# Patient Record
Sex: FEMALE | Race: WHITE | Employment: OTHER | URBAN - METROPOLITAN AREA
[De-identification: names, ages, dates, MRNs, and addresses within clinical notes are randomized per-mention and may not be internally consistent; named-entity substitution may affect disease eponyms.]

---

## 2018-10-15 RX ORDER — DULOXETIN HYDROCHLORIDE 60 MG/1
60 CAPSULE, DELAYED RELEASE ORAL 2 TIMES DAILY
COMMUNITY

## 2018-10-15 RX ORDER — LIOTHYRONINE SODIUM 5 UG/1
10 TABLET ORAL DAILY
COMMUNITY

## 2018-10-17 ENCOUNTER — HOSPITAL ENCOUNTER (OUTPATIENT)
Age: 68
Setting detail: OUTPATIENT SURGERY
Discharge: HOME | End: 2018-10-17
Attending: INTERNAL MEDICINE | Admitting: INTERNAL MEDICINE
Payer: MEDICARE

## 2018-10-17 ENCOUNTER — ANESTHESIA (OUTPATIENT)
Dept: ENDOSCOPY | Age: 68
End: 2018-10-17
Payer: MEDICARE

## 2018-10-17 ENCOUNTER — ANESTHESIA EVENT (OUTPATIENT)
Dept: ENDOSCOPY | Age: 68
End: 2018-10-17
Payer: MEDICARE

## 2018-10-17 VITALS
TEMPERATURE: 96 F | HEART RATE: 75 BPM | OXYGEN SATURATION: 95 % | WEIGHT: 195 LBS | SYSTOLIC BLOOD PRESSURE: 140 MMHG | RESPIRATION RATE: 16 BRPM | DIASTOLIC BLOOD PRESSURE: 76 MMHG | BODY MASS INDEX: 28.88 KG/M2 | HEIGHT: 69 IN

## 2018-10-17 VITALS
OXYGEN SATURATION: 97 % | RESPIRATION RATE: 16 BRPM | DIASTOLIC BLOOD PRESSURE: 76 MMHG | SYSTOLIC BLOOD PRESSURE: 120 MMHG

## 2018-10-17 PROCEDURE — 2580000003 HC RX 258: Performed by: ANESTHESIOLOGY

## 2018-10-17 PROCEDURE — 6360000002 HC RX W HCPCS: Performed by: NURSE ANESTHETIST, CERTIFIED REGISTERED

## 2018-10-17 PROCEDURE — 3700000000 HC ANESTHESIA ATTENDED CARE: Performed by: INTERNAL MEDICINE

## 2018-10-17 PROCEDURE — 3700000001 HC ADD 15 MINUTES (ANESTHESIA): Performed by: INTERNAL MEDICINE

## 2018-10-17 PROCEDURE — 3609010200 HC COLONOSCOPY ABLATION TUMOR POLYP/OTHER LES: Performed by: INTERNAL MEDICINE

## 2018-10-17 PROCEDURE — 3609010300 HC COLONOSCOPY W/BIOPSY SINGLE/MULTIPLE: Performed by: INTERNAL MEDICINE

## 2018-10-17 PROCEDURE — 3609010600 HC COLONOSCOPY POLYPECTOMY SNARE/COLD BIOPSY: Performed by: INTERNAL MEDICINE

## 2018-10-17 PROCEDURE — 2500000003 HC RX 250 WO HCPCS: Performed by: NURSE ANESTHETIST, CERTIFIED REGISTERED

## 2018-10-17 PROCEDURE — 88305 TISSUE EXAM BY PATHOLOGIST: CPT

## 2018-10-17 PROCEDURE — 7100000011 HC PHASE II RECOVERY - ADDTL 15 MIN: Performed by: INTERNAL MEDICINE

## 2018-10-17 PROCEDURE — 7100000010 HC PHASE II RECOVERY - FIRST 15 MIN: Performed by: INTERNAL MEDICINE

## 2018-10-17 PROCEDURE — 2580000003 HC RX 258: Performed by: INTERNAL MEDICINE

## 2018-10-17 PROCEDURE — 2709999900 HC NON-CHARGEABLE SUPPLY: Performed by: INTERNAL MEDICINE

## 2018-10-17 RX ORDER — SODIUM CHLORIDE 0.9 % (FLUSH) 0.9 %
10 SYRINGE (ML) INJECTION PRN
Status: DISCONTINUED | OUTPATIENT
Start: 2018-10-17 | End: 2018-10-17 | Stop reason: HOSPADM

## 2018-10-17 RX ORDER — PROPOFOL 10 MG/ML
INJECTION, EMULSION INTRAVENOUS CONTINUOUS PRN
Status: DISCONTINUED | OUTPATIENT
Start: 2018-10-17 | End: 2018-10-17 | Stop reason: SDUPTHER

## 2018-10-17 RX ORDER — LIDOCAINE HYDROCHLORIDE 20 MG/ML
INJECTION, SOLUTION EPIDURAL; INFILTRATION; INTRACAUDAL; PERINEURAL PRN
Status: DISCONTINUED | OUTPATIENT
Start: 2018-10-17 | End: 2018-10-17 | Stop reason: SDUPTHER

## 2018-10-17 RX ORDER — SODIUM CHLORIDE 0.9 % (FLUSH) 0.9 %
10 SYRINGE (ML) INJECTION EVERY 12 HOURS SCHEDULED
Status: DISCONTINUED | OUTPATIENT
Start: 2018-10-17 | End: 2018-10-17 | Stop reason: HOSPADM

## 2018-10-17 RX ORDER — PROPOFOL 10 MG/ML
INJECTION, EMULSION INTRAVENOUS PRN
Status: DISCONTINUED | OUTPATIENT
Start: 2018-10-17 | End: 2018-10-17 | Stop reason: SDUPTHER

## 2018-10-17 RX ORDER — SODIUM CHLORIDE, SODIUM LACTATE, POTASSIUM CHLORIDE, CALCIUM CHLORIDE 600; 310; 30; 20 MG/100ML; MG/100ML; MG/100ML; MG/100ML
INJECTION, SOLUTION INTRAVENOUS CONTINUOUS
Status: DISCONTINUED | OUTPATIENT
Start: 2018-10-17 | End: 2018-10-17 | Stop reason: HOSPADM

## 2018-10-17 RX ORDER — 0.9 % SODIUM CHLORIDE 0.9 %
INTRAVENOUS SOLUTION INTRAVENOUS CONTINUOUS PRN
Status: DISCONTINUED | OUTPATIENT
Start: 2018-10-17 | End: 2018-10-17 | Stop reason: HOSPADM

## 2018-10-17 RX ADMIN — SODIUM CHLORIDE, POTASSIUM CHLORIDE, SODIUM LACTATE AND CALCIUM CHLORIDE: 600; 310; 30; 20 INJECTION, SOLUTION INTRAVENOUS at 06:48

## 2018-10-17 RX ADMIN — LIDOCAINE HYDROCHLORIDE 30 MG: 20 INJECTION, SOLUTION EPIDURAL; INFILTRATION; INTRACAUDAL; PERINEURAL at 07:12

## 2018-10-17 RX ADMIN — PROPOFOL 40 MG: 10 INJECTION, EMULSION INTRAVENOUS at 06:55

## 2018-10-17 RX ADMIN — PROPOFOL 120 MCG/KG/MIN: 10 INJECTION, EMULSION INTRAVENOUS at 06:53

## 2018-10-17 RX ADMIN — PROPOFOL 30 MG: 10 INJECTION, EMULSION INTRAVENOUS at 06:57

## 2018-10-17 RX ADMIN — SODIUM CHLORIDE, POTASSIUM CHLORIDE, SODIUM LACTATE AND CALCIUM CHLORIDE: 600; 310; 30; 20 INJECTION, SOLUTION INTRAVENOUS at 06:34

## 2018-10-17 RX ADMIN — LIDOCAINE HYDROCHLORIDE 20 MG: 20 INJECTION, SOLUTION EPIDURAL; INFILTRATION; INTRACAUDAL; PERINEURAL at 07:09

## 2018-10-17 RX ADMIN — LIDOCAINE HYDROCHLORIDE 100 MG: 20 INJECTION, SOLUTION EPIDURAL; INFILTRATION; INTRACAUDAL; PERINEURAL at 06:53

## 2018-10-17 RX ADMIN — PROPOFOL 50 MG: 10 INJECTION, EMULSION INTRAVENOUS at 06:53

## 2018-10-17 ASSESSMENT — PAIN - FUNCTIONAL ASSESSMENT: PAIN_FUNCTIONAL_ASSESSMENT: 0-10

## 2018-10-17 ASSESSMENT — PAIN SCALES - GENERAL
PAINLEVEL_OUTOF10: 0
PAINLEVEL_OUTOF10: 0

## 2018-10-17 ASSESSMENT — ENCOUNTER SYMPTOMS: SHORTNESS OF BREATH: 1

## 2018-10-17 ASSESSMENT — LIFESTYLE VARIABLES: SMOKING_STATUS: 1

## 2018-10-17 NOTE — ANESTHESIA PRE PROCEDURE
List:  There is no problem list on file for this patient. Past Medical History:        Diagnosis Date    Depression     Fibromyalgia     Shortness of breath on exertion        Past Surgical History:        Procedure Laterality Date    APPENDECTOMY      CHOLECYSTECTOMY      FOOT SURGERY      x2    INTESTINAL BYPASS      RECTAL STRICTURE DILATATION      SHOULDER SURGERY         Social History:    Social History   Substance Use Topics    Smoking status: Current Every Day Smoker     Packs/day: 1.00     Years: 40.00     Types: Cigarettes    Smokeless tobacco: Never Used    Alcohol use No                                Ready to quit: Not Answered  Counseling given: Not Answered      Vital Signs (Current):   Vitals:    10/15/18 1549 10/17/18 0605   BP:  (!) 152/75   Pulse:  88   Resp:  20   Temp:  96.9 °F (36.1 °C)   TempSrc:  Temporal   SpO2:  93%   Weight: 197 lb (89.4 kg) 195 lb (88.5 kg)   Height: 5' 9\" (1.753 m) 5' 9\" (1.753 m)                                              BP Readings from Last 3 Encounters:   10/17/18 (!) 152/75   06/09/14 139/72   05/31/14 142/72       NPO Status: Time of last liquid consumption: 2300                        Time of last solid consumption: 1900                        Date of last liquid consumption: 10/16/18                        Date of last solid food consumption: 10/15/18    BMI:   Wt Readings from Last 3 Encounters:   10/17/18 195 lb (88.5 kg)   06/09/14 197 lb (89.4 kg)   05/31/14 200 lb (90.7 kg)     Body mass index is 28.8 kg/m². CBC: No results found for: WBC, RBC, HGB, HCT, MCV, RDW, PLT    CMP: No results found for: NA, K, CL, CO2, BUN, CREATININE, GFRAA, AGRATIO, LABGLOM, GLUCOSE, PROT, CALCIUM, BILITOT, ALKPHOS, AST, ALT    POC Tests: No results for input(s): POCGLU, POCNA, POCK, POCCL, POCBUN, POCHEMO, POCHCT in the last 72 hours.     Coags: No results found for: PROTIME, INR, APTT    HCG (If Applicable): No results found for: PREGTESTUR, PREGSERUM,

## 2019-03-27 ENCOUNTER — HOSPITAL ENCOUNTER (OUTPATIENT)
Dept: NON INVASIVE DIAGNOSTICS | Age: 69
Discharge: HOME | End: 2019-03-27
Payer: MEDICARE

## 2019-03-27 LAB
LV EF: 71 %
LVEF MODALITY: NORMAL

## 2019-03-27 PROCEDURE — 93306 TTE W/DOPPLER COMPLETE: CPT

## 2020-09-29 ENCOUNTER — OFFICE VISIT (OUTPATIENT)
Dept: ONCOLOGY | Age: 70
End: 2020-09-29
Payer: MEDICARE

## 2020-09-29 ENCOUNTER — HOSPITAL ENCOUNTER (OUTPATIENT)
Dept: INFUSION THERAPY | Age: 70
Discharge: HOME | End: 2020-09-29
Payer: MEDICARE

## 2020-09-29 VITALS
TEMPERATURE: 96.4 F | BODY MASS INDEX: 27.7 KG/M2 | DIASTOLIC BLOOD PRESSURE: 57 MMHG | OXYGEN SATURATION: 93 % | WEIGHT: 187 LBS | SYSTOLIC BLOOD PRESSURE: 119 MMHG | HEART RATE: 77 BPM | HEIGHT: 69 IN

## 2020-09-29 DIAGNOSIS — D72.829 LEUKOCYTOSIS, UNSPECIFIED TYPE: ICD-10-CM

## 2020-09-29 LAB
ALBUMIN SERPL-MCNC: 3.9 G/DL (ref 3.5–5.2)
ALP BLD-CCNC: 86 U/L (ref 35–104)
ALT SERPL-CCNC: 13 U/L (ref 0–32)
ANION GAP SERPL CALCULATED.3IONS-SCNC: 11 MMOL/L (ref 7–16)
AST SERPL-CCNC: 13 U/L (ref 0–31)
BASOPHILS ABSOLUTE: 0.05 E9/L (ref 0–0.2)
BASOPHILS RELATIVE PERCENT: 0.3 % (ref 0–2)
BILIRUB SERPL-MCNC: 0.3 MG/DL (ref 0–1.2)
BUN BLDV-MCNC: 16 MG/DL (ref 8–23)
CALCIUM SERPL-MCNC: 9.7 MG/DL (ref 8.6–10.2)
CHLORIDE BLD-SCNC: 102 MMOL/L (ref 98–107)
CO2: 27 MMOL/L (ref 22–29)
CREAT SERPL-MCNC: 0.8 MG/DL (ref 0.5–1)
EOSINOPHILS ABSOLUTE: 0.36 E9/L (ref 0.05–0.5)
EOSINOPHILS RELATIVE PERCENT: 2.3 % (ref 0–6)
GFR AFRICAN AMERICAN: >60
GFR NON-AFRICAN AMERICAN: >60 ML/MIN/1.73
GLUCOSE BLD-MCNC: 91 MG/DL (ref 74–99)
HCT VFR BLD CALC: 44.3 % (ref 34–48)
HEMOGLOBIN: 14.8 G/DL (ref 11.5–15.5)
IMMATURE GRANULOCYTES #: 0.09 E9/L
IMMATURE GRANULOCYTES %: 0.6 % (ref 0–5)
LACTATE DEHYDROGENASE: 173 U/L (ref 135–214)
LYMPHOCYTES ABSOLUTE: 2.95 E9/L (ref 1.5–4)
LYMPHOCYTES RELATIVE PERCENT: 19.1 % (ref 20–42)
MCH RBC QN AUTO: 30.8 PG (ref 26–35)
MCHC RBC AUTO-ENTMCNC: 33.4 % (ref 32–34.5)
MCV RBC AUTO: 92.3 FL (ref 80–99.9)
MONOCYTES ABSOLUTE: 1.32 E9/L (ref 0.1–0.95)
MONOCYTES RELATIVE PERCENT: 8.5 % (ref 2–12)
NEUTROPHILS ABSOLUTE: 10.69 E9/L (ref 1.8–7.3)
NEUTROPHILS RELATIVE PERCENT: 69.2 % (ref 43–80)
PDW BLD-RTO: 13.5 FL (ref 11.5–15)
PLATELET # BLD: 248 E9/L (ref 130–450)
PMV BLD AUTO: 11.6 FL (ref 7–12)
POTASSIUM SERPL-SCNC: 4.6 MMOL/L (ref 3.5–5)
RBC # BLD: 4.8 E12/L (ref 3.5–5.5)
SEDIMENTATION RATE, ERYTHROCYTE: 17 MM/HR (ref 0–20)
SODIUM BLD-SCNC: 140 MMOL/L (ref 132–146)
WBC # BLD: 15.5 E9/L (ref 4.5–11.5)

## 2020-09-29 PROCEDURE — 88271 CYTOGENETICS DNA PROBE: CPT

## 2020-09-29 PROCEDURE — 36415 COLL VENOUS BLD VENIPUNCTURE: CPT

## 2020-09-29 PROCEDURE — 83615 LACTATE (LD) (LDH) ENZYME: CPT

## 2020-09-29 PROCEDURE — 86334 IMMUNOFIX E-PHORESIS SERUM: CPT

## 2020-09-29 PROCEDURE — 88275 CYTOGENETICS 100-300: CPT

## 2020-09-29 PROCEDURE — 84165 PROTEIN E-PHORESIS SERUM: CPT

## 2020-09-29 PROCEDURE — 80074 ACUTE HEPATITIS PANEL: CPT

## 2020-09-29 PROCEDURE — G8419 CALC BMI OUT NRM PARAM NOF/U: HCPCS | Performed by: INTERNAL MEDICINE

## 2020-09-29 PROCEDURE — 85025 COMPLETE CBC W/AUTO DIFF WBC: CPT

## 2020-09-29 PROCEDURE — 86431 RHEUMATOID FACTOR QUANT: CPT

## 2020-09-29 PROCEDURE — 82668 ASSAY OF ERYTHROPOIETIN: CPT

## 2020-09-29 PROCEDURE — 85651 RBC SED RATE NONAUTOMATED: CPT

## 2020-09-29 PROCEDURE — 86703 HIV-1/HIV-2 1 RESULT ANTBDY: CPT

## 2020-09-29 PROCEDURE — 81219 CALR GENE COM VARIANTS: CPT

## 2020-09-29 PROCEDURE — 81402 MOPATH PROCEDURE LEVEL 3: CPT

## 2020-09-29 PROCEDURE — 88184 FLOWCYTOMETRY/ TC 1 MARKER: CPT

## 2020-09-29 PROCEDURE — 80053 COMPREHEN METABOLIC PANEL: CPT

## 2020-09-29 PROCEDURE — 86140 C-REACTIVE PROTEIN: CPT

## 2020-09-29 PROCEDURE — G8428 CUR MEDS NOT DOCUMENT: HCPCS | Performed by: INTERNAL MEDICINE

## 2020-09-29 PROCEDURE — 99214 OFFICE O/P EST MOD 30 MIN: CPT

## 2020-09-29 PROCEDURE — 86038 ANTINUCLEAR ANTIBODIES: CPT

## 2020-09-29 PROCEDURE — 81270 JAK2 GENE: CPT

## 2020-09-29 PROCEDURE — 88185 FLOWCYTOMETRY/TC ADD-ON: CPT

## 2020-09-29 PROCEDURE — 1090F PRES/ABSN URINE INCON ASSESS: CPT | Performed by: INTERNAL MEDICINE

## 2020-09-29 PROCEDURE — 88237 TISSUE CULTURE BONE MARROW: CPT

## 2020-09-29 PROCEDURE — 82232 ASSAY OF BETA-2 PROTEIN: CPT

## 2020-09-29 PROCEDURE — 99205 OFFICE O/P NEW HI 60 MIN: CPT | Performed by: INTERNAL MEDICINE

## 2020-09-29 NOTE — PROGRESS NOTES
Department of [facility] Med Oncology  Attending Consult Note    Reason for Visit:  Consultation on a patient with Leukocytosis/neutrophilia    Referring Physician: Chelo Eisenberg MD    PCP:  Chelo Eisenberg MD    History of Present Illness:  58-year-old female with history of depression, fibromyalgia, current smoker (1 pack/day for 40 years), COPD, who was noted to have leukocytosis on routine CBC  On 09/08/2020: Hb 15.8Hct 46.3 MCV 91.1   WBC 14.6  ANC 10.46  Abs mono 0.94  Peripheral blood smear: Borderline erythrocytosis and leukocytosis with mature granulocytosis. The red cell population appears essentially normal.  Circulating granulocytes show no dysplastic changes or hypersegmentation. There are no circulating blast forms. There is no eosinophilia or basophilia. The remaining leukocytes appear morphologically normal with the exception of occasional reactive lymphocytes. No platelet abnormalities are detected. If clinically indicated serologic testing for the JAK2 mutation can be utilized to exclude the possibility of polycythemia vera. Leukocytosis appears reactive. No fever chills. Good appetite and energy level. SOB stable (hx of COPD follows with Dr. Cisco Prader). No nausea vomiting. No diarrhea. No recurrent infections. No systemic symptoms. No history of neoplasm. No prior chemotherapy or radiation therapy. No known history of autoimmune disorders. Review of Systems;  CONSTITUTIONAL: No fever, chills. Good appetite and energy level. ENMT: Eyes: No diplopia; Nose: No epistaxis. Mouth: No sore throat. RESPIRATORY: No hemoptysis. Shortness of breath stable  CARDIOVASCULAR: No chest pain, palpitations. GASTROINTESTINAL: No nausea/vomiting, abdominal pain, diarrhea/constipation. GENITOURINARY: No dysuria, urinary frequency, hematuria. NEURO: No syncope, presyncope, headache.   Remainder:  ROS NEGATIVE    Past Medical History:      Diagnosis Date    Depression     Fibromyalgia     Shortness abused: Not on file     Physically abused: Not on file     Forced sexual activity: Not on file   Other Topics Concern    Not on file   Social History Narrative    Not on file     Allergies: Allergies   Allergen Reactions    Penicillins      Physical Exam:  BP (!) 119/57 (Site: Left Upper Arm, Position: Sitting, Cuff Size: Large Adult)   Pulse 77   Temp 96.4 °F (35.8 °C) (Temporal)   Ht 5' 9\" (1.753 m)   Wt 187 lb (84.8 kg)   SpO2 93%   BMI 27.62 kg/m²   GENERAL: Alert, oriented x 3, not in acute distress. HEENT: PERRLA; EOMI. Oropharynx clear. NECK: Supple. Without lymphadenopathy. LUNGS: Good air entry bilaterally. No wheezing, crackles or ronchi. CARDIOVASCULAR: Regular rate. No murmurs, rubs or gallops. ABDOMEN: Soft. Non-tender, non-distended. Positive bowel sounds. EXTREMITIES: Without clubbing, cyanosis, or edema. NEUROLOGIC: No focal deficits. Impression/Plan:  71-year-old female with history of depression, fibromyalgia, current smoker (1 pack/day for 40 years), COPD, who was noted to have leukocytosis/neutrophilia on routine CBC  Peripheral blood smear: Borderline erythrocytosis and leukocytosis with mature granulocytosis. The red cell population appears essentially normal.  Circulating granulocytes show no dysplastic changes or hypersegmentation. There are no circulating blast forms. There is no eosinophilia or basophilia. The remaining leukocytes appear morphologically normal with the exception of occasional reactive lymphocytes. No platelet abnormalities are detected. If clinically indicated serologic testing for the JAK2 mutation can be utilized to exclude the possibility of polycythemia vera. Leukocytosis appears reactive. No systemic symptoms. Extensive blood work ordered to see if reactive leukocytosis/neutrophilia versus underlying MPD. Abdominal ultrasound ordered to check for splenomegaly  Return to clinic in 2 weeks to review test results.   She is still smoking.  Smoking cessation was recommended    Thank you for allowing us to participate in the care of . Demarcus Ocampo MD   9/29/2020

## 2020-09-29 NOTE — PROGRESS NOTES
Padmaja Fabienne  1950 77 y.o. Referring Physician: Dr Bernardo Redmond    PCP: Christiano Bustamante MD    There were no vitals filed for this visit. Wt Readings from Last 3 Encounters:   20 187 lb (84.8 kg)   10/17/18 195 lb (88.5 kg)        There is no height or weight on file to calculate BMI. Chief Complaint: \"My blood count is high\"       Cancer Staging  No matching staging information was found for the patient. Prior Radiation Therapy? NO    Concurrent Chemo/radiation? NO    Prior Chemotherapy? NO    Prior Hormonal Therapy? NO    Head and Neck Cancer? No, patient does NOT have HN cancer.       LMP: 20 years ago    Age at first Menses: 7    : 3    Para: 1          Current Outpatient Medications:     liothyronine (CYTOMEL) 5 MCG tablet, Take 10 mcg by mouth daily, Disp: , Rfl:     DULoxetine (CYMBALTA) 60 MG extended release capsule, Take 60 mg by mouth 2 times daily, Disp: , Rfl:     Albuterol Sulfate (PROAIR HFA IN), Inhale 2 puffs into the lungs 2 times daily, Disp: , Rfl:     Budesonide-Formoterol Fumarate (SYMBICORT IN), Inhale 2 puffs into the lungs 2 times daily, Disp: , Rfl:     Multiple Vitamins-Minerals (VITEYES AREDS FORMULA PO), Take 1 tablet by mouth 2 times daily, Disp: , Rfl:     Cyanocobalamin (VITAMIN B 12 PO), Take 1,500 mg by mouth daily, Disp: , Rfl:     busPIRone (BUSPAR) 10 MG tablet, Take 15 mg by mouth 2 times daily 30mg in am and 15mg supper, Disp: , Rfl:     ibuprofen (ADVIL;MOTRIN) 800 MG tablet, Take 800 mg by mouth 2 times daily , Disp: , Rfl:     Cholecalciferol (VITAMIN D3) 5000 UNITS TABS, Take 10,000 Units by mouth 2 tabs am  1 tab pm, Disp: , Rfl:        Past Medical History:   Diagnosis Date    Depression     Fibromyalgia     Shortness of breath on exertion        Past Surgical History:   Procedure Laterality Date    APPENDECTOMY      CHOLECYSTECTOMY      COLONOSCOPY  10/17/2018    COLONOSCOPY WITH BIOPSY performed by Austin Gentile MD at [facility]    COLONOSCOPY  10/17/2018    COLONOSCOPY POLYPECTOMY SNARE/COLD BIOPSY performed by Junie Soulier, MD at \[facility]"" 82  10/17/2018    COLONOSCOPY W/ ENDOSCOPIC MUCOSAL RESECTION performed by Junie Soulier, MD at [address] [place] [address]    INTESTINAL BYPASS      RECTAL STRICTURE DILATATION      SHOULDER SURGERY         No family history on file.     Social History     Socioeconomic History    Marital status:      Spouse name: Not on file    Number of children: Not on file    Years of education: Not on file    Highest education level: Not on file   Occupational History    Not on file   Social Needs    Financial resource strain: Not on file    Food insecurity     Worry: Not on file     Inability: Not on file    Transportation needs     Medical: Not on file     Non-medical: Not on file   Tobacco Use    Smoking status: Current Every Day Smoker     Packs/day: 1.00     Years: 40.00     Pack years: 40.00     Types: Cigarettes    Smokeless tobacco: Never Used   Substance and Sexual Activity    Alcohol use: No    Drug use: No    Sexual activity: Not on file   Lifestyle    Physical activity     Days per week: Not on file     Minutes per session: Not on file    Stress: Not on file   Relationships    Social connections     Talks on phone: Not on file     Gets together: Not on file     Attends Yazidism service: Not on file     Active member of club or organization: Not on file     Attends meetings of clubs or organizations: Not on file     Relationship status: Not on file    Intimate partner violence     Fear of current or ex partner: Not on file     Emotionally abused: Not on file     Physically abused: Not on file     Forced sexual activity: Not on file   Other Topics Concern    Not on file   Social History Narrative    Not on file           Occupation: retired  Retired:  YES: Patient is retired from [address] [place] [address] .          [employer] SYSTEMS:   Pacemaker/Defibulator/ICD:  No    Mediport: No           FALLS RISK SCREENING ASSESSMENT    Instructions:  Assess the patient and [place] the appropriate indicators that are present for fall risk identification. Total the numbers circled and assign a fall risk score from Table 2.  Reassess patient at a minimum every 12 weeks or with status change. Assessment   Date  9/29/2020     1. Mental Ability: confusion/cognitively impaired No - 0       2. Elimination Issues: incontinence, frequency No - 0       3. Ambulatory: use of assistive devices (walker, cane, off-loading devices), attached to equipment (IV pole, oxygen) No - 0     4. Sensory Limitations: dizziness, vertigo, impaired vision No - 0       5. Age 72 years or greater - 1       10. Medication: diuretics, strong analgesics, hypnotics, sedatives, antihypertensive agents   Yes - 3   7. Falls:  recent history of falls within the last 3 months (not to include slipping or tripping)   No - 0   TOTAL 4    If score of 4 or greater was education given? Yes       TABLE 2   Risk Score Risk Level Plan of Care   0-3 Little or  No Risk 1. Provide assistance as indicated for ambulation activities  2. Reorient confused/cognitively impaired patient  3. Call-light/bell within patient's reach  4. Chair/bed in low position, stretcher/bed with siderails up except when performing patient care activities  5. Educate patient/family/caregiver on falls prevention  6.  Reassess in 12 weeks or with any noted change in patient condition which places them at a risk for a fall   4-6 Moderate Risk 1. Provide assistance as indicated for ambulation activities  2. Reorient confused/cognitively impaired patient  3. Call-light/bell within patient's reach  4. Chair/bed in low position, stretcher/bed with siderails up except when performing patient care activities  5. Educate patient/family/caregiver on falls prevention  6.   Falls risk precaution (Yellow sticker Level II) placed on patient chart   7 or   Higher High Risk 1. Place patient in easily observable treatment room  2. Patient attended at all times by family member or staff  3. Provide assistance as indicated for ambulation activities  4. Reorient confused/cognitively impaired patient  5. Call-light/bell within patient's reach  6. Chair/bed in low position, stretcher/bed with siderails up except when performing patient care activities  7. Educate patient/family/caregiver on falls prevention  8. Falls risk precaution (Yellow sticker Level III) placed on patient chart           MALNUTRITION RISK SCREENING ASSESSMENT    Instructions:  Assess the patient and enter the appropriate indicators that are present for nutrition risk identification. Total the numbers entered and assign a risk score. Follow the appropriate action for total score listed below. Assessment   Date  9/29/2020     1. Have you lost weight without trying? 0- No     2. Have you been eating poorly because of a decreased appetite? 0- No   3. Do you have a diagnosis of head and neck cancer?       0- No                                                                                    TOTAL 0        Score of 0-1: No action  Score 2 or greater:  · For Non-Diabetic Patient: Recommend adding Ensure Enlive 2 x daily and provide patient with Ensure wellness bag with coupons  · For Diabetic Patient: Recommend adding Glucerna Shake 2 x daily and provide patient with Glucerna Wellness bag with coupons  · Route to the dietitian via [employer]

## 2020-09-30 LAB
ANTI-NUCLEAR ANTIBODY (ANA): NEGATIVE
C-REACTIVE PROTEIN: 0.1 MG/DL (ref 0–0.4)
HAV IGM SER IA-ACNC: NORMAL
HEPATITIS B CORE IGM ANTIBODY: NORMAL
HEPATITIS B SURFACE ANTIGEN INTERPRETATION: NORMAL
HEPATITIS C ANTIBODY INTERPRETATION: NORMAL
HIV-1 AND HIV-2 ANTIBODIES: NORMAL
PATHOLOGIST REVIEW: NORMAL
RHEUMATOID FACTOR: <10 IU/ML (ref 0–13)

## 2020-10-01 LAB
ALBUMIN SERPL-MCNC: 3.4 G/DL (ref 3.5–4.7)
ALPHA-1-GLOBULIN: 0.2 G/DL (ref 0.2–0.4)
ALPHA-2-GLOBULIN: 1 G/FL (ref 0.5–1)
BETA GLOBULIN: 0.9 G/DL (ref 0.8–1.3)
BETA-2 MICROGLOBULIN: 2.4 MG/L (ref 0.6–2.4)
ELECTROPHORESIS: ABNORMAL
GAMMA GLOBULIN: 1.1 G/DL (ref 0.7–1.6)
IMMUNOFIXATION RESULT, SERUM: NORMAL
TOTAL PROTEIN: 6.6 G/DL (ref 6.4–8.3)

## 2020-10-02 LAB — ERYTHROPOIETIN: 8 MU/ML (ref 4–27)

## 2020-10-05 LAB
Lab: NORMAL
REPORT: NORMAL
THIS TEST SENT TO: NORMAL

## 2020-10-09 LAB
Lab: NORMAL
REPORT: NORMAL
THIS TEST SENT TO: NORMAL

## 2020-10-12 LAB
Lab: NORMAL
REPORT: NORMAL
THIS TEST SENT TO: NORMAL

## 2020-10-17 ENCOUNTER — HOSPITAL ENCOUNTER (OUTPATIENT)
Dept: ULTRASOUND IMAGING | Age: 70
Discharge: HOME | End: 2020-10-17
Payer: MEDICARE

## 2020-10-17 PROCEDURE — 76705 ECHO EXAM OF ABDOMEN: CPT

## 2020-10-27 ENCOUNTER — HOSPITAL ENCOUNTER (OUTPATIENT)
Dept: INFUSION THERAPY | Age: 70
End: 2020-10-27
Payer: MEDICARE

## 2020-11-17 ENCOUNTER — OFFICE VISIT (OUTPATIENT)
Dept: ONCOLOGY | Age: 70
End: 2020-11-17
Payer: MEDICARE

## 2020-11-17 ENCOUNTER — HOSPITAL ENCOUNTER (OUTPATIENT)
Dept: INFUSION THERAPY | Age: 70
Discharge: HOME | End: 2020-11-17
Payer: MEDICARE

## 2020-11-17 VITALS
HEART RATE: 81 BPM | SYSTOLIC BLOOD PRESSURE: 124 MMHG | WEIGHT: 185 LBS | BODY MASS INDEX: 29.73 KG/M2 | OXYGEN SATURATION: 91 % | DIASTOLIC BLOOD PRESSURE: 71 MMHG | TEMPERATURE: 96 F | HEIGHT: 66 IN

## 2020-11-17 PROCEDURE — 99212 OFFICE O/P EST SF 10 MIN: CPT

## 2020-11-17 PROCEDURE — G8417 CALC BMI ABV UP PARAM F/U: HCPCS | Performed by: INTERNAL MEDICINE

## 2020-11-17 PROCEDURE — G8427 DOCREV CUR MEDS BY ELIG CLIN: HCPCS | Performed by: INTERNAL MEDICINE

## 2020-11-17 PROCEDURE — G8484 FLU IMMUNIZE NO ADMIN: HCPCS | Performed by: INTERNAL MEDICINE

## 2020-11-17 PROCEDURE — G8400 PT W/DXA NO RESULTS DOC: HCPCS | Performed by: INTERNAL MEDICINE

## 2020-11-17 PROCEDURE — 99214 OFFICE O/P EST MOD 30 MIN: CPT | Performed by: INTERNAL MEDICINE

## 2020-11-17 PROCEDURE — 3017F COLORECTAL CA SCREEN DOC REV: CPT | Performed by: INTERNAL MEDICINE

## 2020-11-17 PROCEDURE — 4040F PNEUMOC VAC/ADMIN/RCVD: CPT | Performed by: INTERNAL MEDICINE

## 2020-11-17 PROCEDURE — 1123F ACP DISCUSS/DSCN MKR DOCD: CPT | Performed by: INTERNAL MEDICINE

## 2020-11-17 PROCEDURE — 4004F PT TOBACCO SCREEN RCVD TLK: CPT | Performed by: INTERNAL MEDICINE

## 2020-11-17 PROCEDURE — 1090F PRES/ABSN URINE INCON ASSESS: CPT | Performed by: INTERNAL MEDICINE

## 2020-11-17 NOTE — PROGRESS NOTES
[place] [id number]  Attending Clinic Note    Reason for Visit: Follow-up on a patient with Leukocytosis/neutrophilia    PCP:  Armando Rivera MD    History of Present Illness:  70-year-old female with history of depression, fibromyalgia, current smoker (1 pack/day for 40 years), COPD, who was noted to have leukocytosis on routine CBC    On 09/08/2020: Hb 15.8Hct 46.3 MCV 91.1   WBC 14.6  ANC 10.46  Abs mono 0.94  Peripheral blood smear: Borderline erythrocytosis and leukocytosis with mature granulocytosis. The red cell population appears essentially normal.  Circulating granulocytes show no dysplastic changes or hypersegmentation. There are no circulating blast forms. There is no eosinophilia or basophilia. The remaining leukocytes appear morphologically normal with the exception of occasional reactive lymphocytes. No platelet abnormalities are detected. If clinically indicated serologic testing for the JAK2 mutation can be utilized to exclude the possibility of polycythemia vera. Leukocytosis appears reactive. No fever chills. Good appetite and energy level. SOB stable (hx of COPD follows with Dr. Rosa Herrera). No nausea vomiting. No diarrhea. No recurrent infections. No systemic symptoms. No history of neoplasm. No prior chemotherapy or radiation therapy. No known history of autoimmune disorders. Review of Systems;  CONSTITUTIONAL: No fever, chills. Good appetite and energy level. ENMT: Eyes: No diplopia; Nose: No epistaxis. Mouth: No sore throat. RESPIRATORY: No hemoptysis. Shortness of breath stable  CARDIOVASCULAR: No chest pain, palpitations. GASTROINTESTINAL: No nausea/vomiting, abdominal pain, diarrhea/constipation. GENITOURINARY: No dysuria, urinary frequency, hematuria. NEURO: No syncope, presyncope, headache.   Remainder:  ROS NEGATIVE    Past Medical History:      Diagnosis Date    Asthma     COPD (chronic obstructive pulmonary disease) ([facility] [address]     Depression     Fibromyalgia     Shortness of breath on exertion     Thyroid disease      Medications:  Reviewed and reconciled. Allergies: Allergies   Allergen Reactions    Penicillins      Physical Exam:  /71 (Site: Right Upper Arm, Position: Sitting, Cuff Size: Medium Adult)   Pulse 81   Temp 96 °F (35.6 °C) (Temporal)   Ht 5' 6\" (1.676 m)   Wt 185 lb (83.9 kg)   SpO2 91%   BMI 29.86 kg/m²   GENERAL: Alert, oriented x 3, not in acute distress. HEENT: PERRLA; EOMI. Oropharynx clear. NECK: Supple. Without lymphadenopathy. LUNGS: Good air entry bilaterally. No wheezing, crackles or ronchi. CARDIOVASCULAR: Regular rate. No murmurs, rubs or gallops. ABDOMEN: Soft. Non-tender, non-distended. Positive bowel sounds. EXTREMITIES: Without clubbing, cyanosis, or edema. NEUROLOGIC: No focal deficits. Impression/Plan:  78-year-old female with history of depression, fibromyalgia, current smoker (1 pack/day for 40 years), COPD, who was noted to have leukocytosis/neutrophilia on routine CBC    Peripheral blood smear: Borderline erythrocytosis and leukocytosis with mature granulocytosis. The red cell population appears essentially normal.  Circulating granulocytes show no dysplastic changes or hypersegmentation. There are no circulating blast forms. There is no eosinophilia or basophilia. The remaining leukocytes appear morphologically normal with the exception of occasional reactive lymphocytes. No platelet abnormalities are detected. If clinically indicated serologic testing for the JAK2 mutation can be utilized to exclude the possibility of polycythemia vera. Leukocytosis appears reactive. No systemic symptoms.      On 09/29/2020:  Hb 14.8 Hct 44.3  MCV 92.3    WBC 15.5  ANC 10.69  Abs Mono 1.32 (0.10-0.95) ALC 2.95    Examination of the peripheral smear and blood counts/indices reveals leukocytosis with mature granulocytosis and monocytosis.  The red cell population appears essentially normal.    Circulating neutrophils show no dysplastic changes or hypersegmentation.  There are no circulating blast forms.  The remaining leukocytes, including monocytes, appear morphologically unremarkable. No platelet abnormalities are detected. The leukocytosis appears reactive    CMP wnl  ESR, CRP, LDH, Beta-2 microglobulin wnl  RF <10  TAO Negative  Acute hepatitis panel, HIV Non-Reactive  SPEP: Normal. No monoclonal protein identified  SIFE: Normal. No monoclonal protein is identified. Peripheral blood flow cytometry no immunophenotypic evidence of acute leukemia or a T-cell neoplasm  Small population of CD5(+) B lymphocytes (<1% of total cells) with dim kappa light expression in a background of predominantly polytypic B lymphocytes. Given the small size of this population significance is unclear and may just represent monoclonal B-cell lymphocytosis. BCR/ABL1 interface FISH negative study  JAK2 Mutation Not detected  CALR Exon 9 Mutation Not detected  MPL Mutation Not detected    Abdominal ultrasound 10/17/2020 noted no splenomegaly    Leukocytosis/neutrophilia likely secondary due to smoking. To evaluate small population of CD5(+) B lymphocytes (<1% of total cells) with dim kappa light expression seen on blood flow cytometry, recommended CT chest/abdomen/pelvis (to check for enlarged LN) and bone marrow biopsy (to check for underlying lymphoproliferative disorder). Patient declined CT chest/abdomen/pelvis and BMBx and would like only to follow her counts in 3 months. RTC 3 months with labs. She is still smoking.  Smoking cessation was recommended    Makayla Ramirez MD   11/17/2020

## 2021-02-15 DIAGNOSIS — D72.829 LEUKOCYTOSIS, UNSPECIFIED TYPE: Primary | ICD-10-CM

## 2021-02-28 ENCOUNTER — IMMUNIZATION (OUTPATIENT)
Dept: PRIMARY CARE CLINIC | Age: 71
End: 2021-02-28
Payer: MEDICARE

## 2021-02-28 PROCEDURE — 91300 COVID-19, PFIZER VACCINE 30MCG/0.3ML DOSE: CPT | Performed by: INTERNAL MEDICINE

## 2021-02-28 PROCEDURE — 0001A COVID-19, PFIZER VACCINE 30MCG/0.3ML DOSE: CPT | Performed by: INTERNAL MEDICINE

## 2021-03-23 ENCOUNTER — IMMUNIZATION (OUTPATIENT)
Dept: PRIMARY CARE CLINIC | Age: 71
End: 2021-03-23
Payer: MEDICARE

## 2021-03-23 PROCEDURE — 0002A COVID-19, PFIZER VACCINE 30MCG/0.3ML DOSE: CPT | Performed by: NURSE PRACTITIONER

## 2021-03-23 PROCEDURE — 91300 COVID-19, PFIZER VACCINE 30MCG/0.3ML DOSE: CPT | Performed by: NURSE PRACTITIONER

## 2025-06-12 ENCOUNTER — TRANSCRIBE ORDERS (OUTPATIENT)
Dept: ADMINISTRATIVE | Age: 75
End: 2025-06-12

## 2025-06-12 DIAGNOSIS — Z87.891 PERSONAL HISTORY OF TOBACCO USE, PRESENTING HAZARDS TO HEALTH: Primary | ICD-10-CM

## 2025-06-12 DIAGNOSIS — F17.211 CIGARETTE NICOTINE DEPENDENCE IN REMISSION: ICD-10-CM

## 2025-08-01 ENCOUNTER — CLINICAL DOCUMENTATION (OUTPATIENT)
Dept: CASE MANAGEMENT | Age: 75
End: 2025-08-01

## 2025-08-01 VITALS — BODY MASS INDEX: 28.25 KG/M2 | WEIGHT: 180 LBS | HEIGHT: 67 IN

## (undated) DEVICE — LUBRICANT SURG JELLY ST BACTER TUBE 4.25OZ

## (undated) DEVICE — Device: Brand: DEFENDO VALVE AND CONNECTOR KIT

## (undated) DEVICE — FORCEPS BX L240CM JAW DIA2.8MM L CAP W/ NDL MIC MESH TOOTH

## (undated) DEVICE — NEEDLE SCLERO 25GA L240CM OD0.51MM ID0.24MM EXTN L4MM SHTH

## (undated) DEVICE — 6 X 9  1.75MIL 4-WALL LABGUARD: Brand: MINIGRIP COMMERCIAL LLC

## (undated) DEVICE — KENDALL 450 SERIES MONITORING FOAM ELECTRODE - RECTANGULAR SHAPE ( 3/PK): Brand: KENDALL

## (undated) DEVICE — CONTAINER SPEC COLL 960ML POLYPR TRIANG GRAD INTAKE/OUTPUT

## (undated) DEVICE — KIT BEDSIDE REVITAL OX 500ML

## (undated) DEVICE — MASK,FACE,MAXFLUIDPROTECT,SHIELD/ERLPS: Brand: MEDLINE

## (undated) DEVICE — SPONGE GZ 4IN 4IN 4 PLY N WVN AVANT

## (undated) DEVICE — TUBING, SUCTION, 1/4" X 10', STRAIGHT: Brand: MEDLINE

## (undated) DEVICE — GOWN ISOLATN REG YEL M WT MULTIPLY SIDETIE LEV 2

## (undated) DEVICE — TRAP SURG QUAD PARABOLA SLOT DSGN SFTY SCRN TRAPEASE